# Patient Record
Sex: MALE | Race: WHITE | NOT HISPANIC OR LATINO | Employment: STUDENT | URBAN - METROPOLITAN AREA
[De-identification: names, ages, dates, MRNs, and addresses within clinical notes are randomized per-mention and may not be internally consistent; named-entity substitution may affect disease eponyms.]

---
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
## 2022-10-03 ENCOUNTER — APPOINTMENT (OUTPATIENT)
Dept: GENERAL RADIOLOGY | Facility: OTHER | Age: 22
End: 2022-10-03
Attending: STUDENT IN AN ORGANIZED HEALTH CARE EDUCATION/TRAINING PROGRAM
Payer: COMMERCIAL

## 2022-10-03 ENCOUNTER — HOSPITAL ENCOUNTER (EMERGENCY)
Facility: OTHER | Age: 22
Discharge: HOME | End: 2022-10-03
Attending: PHYSICIAN ASSISTANT | Admitting: PHYSICIAN ASSISTANT
Payer: COMMERCIAL

## 2022-10-03 VITALS
TEMPERATURE: 98.7 F | OXYGEN SATURATION: 99 % | WEIGHT: 180 LBS | SYSTOLIC BLOOD PRESSURE: 147 MMHG | DIASTOLIC BLOOD PRESSURE: 94 MMHG | RESPIRATION RATE: 16 BRPM | HEIGHT: 68 IN | HEART RATE: 93 BPM | BODY MASS INDEX: 27.28 KG/M2

## 2022-10-03 DIAGNOSIS — S82.61XA AVULSION FRACTURE OF LATERAL MALLEOLUS OF RIGHT FIBULA: ICD-10-CM

## 2022-10-03 PROCEDURE — 99283 EMERGENCY DEPT VISIT LOW MDM: CPT | Performed by: PHYSICIAN ASSISTANT

## 2022-10-03 PROCEDURE — 73610 X-RAY EXAM OF ANKLE: CPT | Mod: RT

## 2022-10-03 ASSESSMENT — ACTIVITIES OF DAILY LIVING (ADL): ADLS_ACUITY_SCORE: 33

## 2022-10-04 NOTE — DISCHARGE INSTRUCTIONS
Get plenty of fluids and rest.  Use rest, ice, compression, elevation.  Use your crutches to help with bearing weight, wear your walking boot to help for stabilization.  He can alternate Tylenol and ibuprofen every 4 hours to help with pain and swelling.  Did place referral to have you follow-up with her sports medicine specialist in approximately 1 week for reassessment.  Return if there are worse or concerning symptoms.

## 2022-10-04 NOTE — ED TRIAGE NOTES
Pt arrives via private vehicle with c/o right ankle injury. Pt was playing baseball tonight and stepped on first wrong and heard a pop, has had numbness in foot since then. Pulse present in right foot, pt feels writer touching foot, swelling noted on out right ankle. Ice pack given to pt.      Triage Assessment     Row Name 10/03/22 1928       Triage Assessment (Adult)    Airway WDL WDL       Respiratory WDL    Respiratory WDL WDL       Skin Circulation/Temperature WDL    Skin Circulation/Temperature WDL WDL       Cardiac WDL    Cardiac WDL WDL       Peripheral/Neurovascular WDL    Peripheral Neurovascular WDL WDL       Cognitive/Neuro/Behavioral WDL    Cognitive/Neuro/Behavioral WDL WDL

## 2022-10-04 NOTE — PROGRESS NOTES
Pt A&O, minimal pain reported.  Leah Villavicencio RN.............................10/3/2022 9:11 PM

## 2022-10-05 ASSESSMENT — ENCOUNTER SYMPTOMS
DYSURIA: 0
FEVER: 0
NAUSEA: 0
VOMITING: 0
CONFUSION: 0
ABDOMINAL PAIN: 0
SHORTNESS OF BREATH: 0

## 2022-10-05 NOTE — ED PROVIDER NOTES
"  History     Chief Complaint   Patient presents with     Ankle Pain     HPI  Obed Real is a 22 year old male who presents to the ED for evaluation of ankle pain. Pt arrives via private vehicle with c/o right ankle injury. Pt was playing baseball tonight and stepped on first wrong and heard a pop, has had numbness in foot since then. Pulse present in right foot, pt feels writer touching foot, swelling noted on out right ankle. Ice pack given to pt.    Allergies:  Allergies   Allergen Reactions     Sulfa Drugs        Problem List:    There are no problems to display for this patient.       Past Medical History:    History reviewed. No pertinent past medical history.    Past Surgical History:    History reviewed. No pertinent surgical history.    Family History:    History reviewed. No pertinent family history.    Social History:  Marital Status:  Single [1]  Social History     Tobacco Use     Smoking status: Never Smoker     Smokeless tobacco: Never Used   Substance Use Topics     Drug use: Never        Medications:    No current outpatient medications on file.        Review of Systems   Constitutional: Negative for fever.   HENT: Negative for congestion.    Eyes: Negative for visual disturbance.   Respiratory: Negative for shortness of breath.    Cardiovascular: Negative for chest pain.   Gastrointestinal: Negative for abdominal pain, nausea and vomiting.   Genitourinary: Negative for dysuria.   Musculoskeletal:        Right ankle swelling and pain   Psychiatric/Behavioral: Negative for confusion.       Physical Exam   BP: (!) 147/94  Pulse: 93  Temp: 98.7  F (37.1  C)  Resp: 16  Height: 172.7 cm (5' 8\")  Weight: 81.6 kg (180 lb)  SpO2: 99 %      Physical Exam  Constitutional:       General: He is not in acute distress.     Appearance: He is well-developed. He is not diaphoretic.   HENT:      Head: Normocephalic and atraumatic.   Eyes:      General: No scleral icterus.     Conjunctiva/sclera: Conjunctivae " normal.   Cardiovascular:      Rate and Rhythm: Normal rate and regular rhythm.   Pulmonary:      Effort: Pulmonary effort is normal.      Breath sounds: Normal breath sounds.   Abdominal:      Palpations: Abdomen is soft.      Tenderness: There is no abdominal tenderness.   Musculoskeletal:         General: Swelling and tenderness present. No deformity.      Cervical back: Neck supple.      Comments: Swelling to the lateral portion of right ankle.  Tenderness to palpation over the lateral malleolus.  No tenderness to palpation over the right fifth metatarsal.   Lymphadenopathy:      Cervical: No cervical adenopathy.   Skin:     General: Skin is warm and dry.      Coloration: Skin is not jaundiced.      Findings: No rash.   Neurological:      Mental Status: He is alert and oriented to person, place, and time. Mental status is at baseline.   Psychiatric:         Mood and Affect: Mood normal.         Behavior: Behavior normal.         Thought Content: Thought content normal.         Judgment: Judgment normal.         ED Course                 Procedures              Critical Care time:  none               No results found for this or any previous visit (from the past 24 hour(s)).    Medications - No data to display    Assessments & Plan (with Medical Decision Making)   Nontoxic and in NAD. He does have Swelling to the lateral portion of right ankle.  Tenderness to palpation over the lateral malleolus.  No tenderness to palpation over the right fifth metatarsal.  No tenderness to palpation to his right knee or proximal tibia.  He does have some high ankle discomfort when I squeeze there.    Xray read as:  There is marked lateral ankle swelling. There is an  avulsive injury at the tip of the lateral malleolus, likely acute  absent known prior trauma. Recommend follow-up.     We will place him in a walking boot, he will continue conservative treatment he is given crutches and told to weight-bear only as tolerated.   Referrals placed for him to follow-up with sports medicine for reassessment.    Strict return precautions are given to the pt, they will return if symptoms are worsening or concerning. The pt understands and agrees with the plan and they are discharged.     Shlomo Carmen PA-C    I have reviewed the nursing notes.    I have reviewed the findings, diagnosis, plan and need for follow up with the patient.       There are no discharge medications for this patient.      Final diagnoses:   Avulsion fracture of lateral malleolus of right fibula       10/3/2022   [facility]     Shlomo Carmen PA  10/05/22 0212

## 2022-10-12 ENCOUNTER — HOSPITAL ENCOUNTER (OUTPATIENT)
Dept: GENERAL RADIOLOGY | Facility: OTHER | Age: 22
Discharge: HOME | End: 2022-10-12
Attending: FAMILY MEDICINE
Payer: COMMERCIAL

## 2022-10-12 ENCOUNTER — OFFICE VISIT (OUTPATIENT)
Dept: FAMILY MEDICINE | Facility: OTHER | Age: 22
End: 2022-10-12
Attending: FAMILY MEDICINE
Payer: COMMERCIAL

## 2022-10-12 VITALS
BODY MASS INDEX: 28.04 KG/M2 | TEMPERATURE: 95.8 F | OXYGEN SATURATION: 98 % | SYSTOLIC BLOOD PRESSURE: 138 MMHG | DIASTOLIC BLOOD PRESSURE: 90 MMHG | HEART RATE: 93 BPM | WEIGHT: 184.4 LBS | RESPIRATION RATE: 16 BRPM

## 2022-10-12 DIAGNOSIS — S82.61XA CLOSED AVULSION FRACTURE OF LATERAL MALLEOLUS OF RIGHT FIBULA, INITIAL ENCOUNTER: Primary | ICD-10-CM

## 2022-10-12 PROCEDURE — 27786 TREATMENT OF ANKLE FRACTURE: CPT | Mod: RT | Performed by: FAMILY MEDICINE

## 2022-10-12 PROCEDURE — 73610 X-RAY EXAM OF ANKLE: CPT | Mod: RT

## 2022-10-12 RX ORDER — IBUPROFEN 200 MG
200 TABLET ORAL EVERY 4 HOURS PRN
COMMUNITY

## 2022-10-12 RX ORDER — ALBUTEROL SULFATE 90 UG/1
AEROSOL, METERED RESPIRATORY (INHALATION)
COMMUNITY
Start: 2022-01-04

## 2022-10-12 ASSESSMENT — PAIN SCALES - GENERAL: PAINLEVEL: MILD PAIN (2)

## 2022-10-12 NOTE — PROGRESS NOTES
Sports Medicine Office Note    HPI:  22-year-old male ICC  coming in for evaluation of a right ankle injury that occurred on 10/3.  He suffered an inversion ankle injury while running to first base.  He was seen in the ER.  He was found to have an avulsion fracture off the lateral malleolus.  He was placed in a boot on crutches.  His symptoms have improved.  He has 2/10 pain.  He characterizes the pain as sharp and aching.  He has difficulty with running, jumping, and climbing stairs.  He has been icing and using OTC medications.  He still has associated bruising and swelling.      EXAM:  BP (!) 138/90 (BP Location: Right arm, Patient Position: Sitting, Cuff Size: Adult Regular)   Pulse 93   Temp (!) 95.8  F (35.4  C) (Tympanic)   Resp 16   Wt 83.6 kg (184 lb 6.4 oz)   SpO2 98%   BMI 28.04 kg/m    MUSCULOSKELETAL EXAM:  RIGHT FOOT AND ANKLE  Inspection:  -No gross deformity  -Prominent bruising and swelling about the ankle/foot  -Scars:  None    Tenderness to palpation of the:  -Fibular head:  Negative  -Fibular shaft:  Negative  -Tibial shaft:  Negative  -Medial malleolus:  Negative  -Deltoid ligament: Mild pain  -Lateral malleolus: Positive  -ATFL: Positive  -CFL: Positive  -PTFL: Positive  -Syndesmosis (AITFL): Mild pain  -Midsubstance Achilles tendon:  Negative  -Achilles tendon insertion:  Negative  -Plantar fascial origin on the calcaneus:  Negative  -Base of the fifth metatarsal:  Negative  -Navicular:  Negative  -Lisfranc joint:  Negative  -Metatarsals:  Negative    Other:  -Intact sensation to light touch distally.  -No signs of cyanosis. Normal skin temperature.  -Knee:  No gross deformity. Normal motion.  -Left foot/ankle:  No gross deformity. No palpable tenderness. Normal strength.      IMAGING:  10/3/2022: 3 view right ankle x-ray  - Small avulsion fracture off of the tip of the lateral malleolus.  Mortise appears intact.  No other bony abnormalities.    10/12/2022: 3 view right  ankle x-ray  - Small avulsion fracture of the tip of the lateral malleolus is again noted.  No change in bony alignment.  Mortise remains intact.      ASSESSMENT/PLAN:  Diagnoses and all orders for this visit:  Closed avulsion fracture of lateral malleolus of right fibula, initial encounter  -     XR Ankle Right G/E 3 Views  -     CLOSED TX DISTAL FIBULA FX W/O MANIPULATION    22-year-old male  with a closed, mildly displaced avulsion fracture off of the tip of the lateral malleolus.  X-rays from 10/3 and 10/12 were both personally reviewed in the office with the findings as demonstrated above by my interpretation.  We discussed that this is essentially a lateral ligament injury.  - Continuing the walking boot  - Crutches as needed  - OTC analgesics as needed  - Discussed ice and elevation to help with swelling  - Follow-up in 5 weeks for repeat x-rays out of the boot  - At that time we will likely begin rehab and coordinating RTP with [facility]    Global fracture code billing (CPT:  14188)       Remi Jameson MD  10/12/2022  8:18 AM    Total time spent with this patient was 25 minutes which included chart review, visualization and independent interpretation of images, time spent with the patient, and documentation.    Procedure time:  0 minute(s)

## 2022-10-12 NOTE — NURSING NOTE
"Chief Complaint   Patient presents with     Ankle injury     Right ankle injury, DOI: 10/3/22     Patient presents for right ankle injury. DOI: 10/3/22. Injured from running while playing baseball and ran through the base and landed on right ankle sideways. Pain 2/10. He is in a tall walking boot and crutches at appointment, he thinks he is about 50% weight bearing on ankle with boot on.     Initial BP (!) 154/98 (BP Location: Right arm, Patient Position: Sitting, Cuff Size: Adult Regular)   Pulse 93   Temp (!) 95.8  F (35.4  C) (Tympanic)   Resp 16   Wt 83.6 kg (184 lb 6.4 oz)   SpO2 98%   BMI 28.04 kg/m   Estimated body mass index is 28.04 kg/m  as calculated from the following:    Height as of 10/3/22: 1.727 m (5' 8\").    Weight as of this encounter: 83.6 kg (184 lb 6.4 oz).       Medication Reconciliation: Complete    Desirae Hopkins LPN .......  10/12/2022  8:22 AM   "

## 2022-11-16 ENCOUNTER — HOSPITAL ENCOUNTER (OUTPATIENT)
Dept: GENERAL RADIOLOGY | Facility: OTHER | Age: 22
Discharge: HOME | End: 2022-11-16
Attending: FAMILY MEDICINE
Payer: COMMERCIAL

## 2022-11-16 ENCOUNTER — OFFICE VISIT (OUTPATIENT)
Dept: FAMILY MEDICINE | Facility: OTHER | Age: 22
End: 2022-11-16
Attending: FAMILY MEDICINE
Payer: COMMERCIAL

## 2022-11-16 VITALS
HEART RATE: 84 BPM | TEMPERATURE: 98.2 F | WEIGHT: 189 LBS | SYSTOLIC BLOOD PRESSURE: 128 MMHG | DIASTOLIC BLOOD PRESSURE: 88 MMHG | RESPIRATION RATE: 16 BRPM | OXYGEN SATURATION: 98 % | BODY MASS INDEX: 28.74 KG/M2

## 2022-11-16 DIAGNOSIS — S82.61XD CLOSED AVULSION FRACTURE OF LATERAL MALLEOLUS OF RIGHT FIBULA WITH ROUTINE HEALING, SUBSEQUENT ENCOUNTER: Primary | ICD-10-CM

## 2022-11-16 PROCEDURE — 99207 PR FRACTURE CARE IN GLOBAL PERIOD: CPT | Performed by: FAMILY MEDICINE

## 2022-11-16 PROCEDURE — 73610 X-RAY EXAM OF ANKLE: CPT | Mod: RT

## 2022-11-16 ASSESSMENT — PAIN SCALES - GENERAL: PAINLEVEL: NO PAIN (1)

## 2022-11-16 NOTE — PROGRESS NOTES
Sports Medicine Office Note    HPI:  22-year-old male ICC  coming in for follow-up evaluation of a right ankle injury that occurred on 10/3.  He suffered an inversion ankle injury while running to first base.  He was seen in the ER that day.  He followed up in this office on 10/12.  He has been in a walking boot.  He still has some dull and aching pain.  He reports his symptoms are 90% improved.  He rates his pain as 1/10.  He has been using ibuprofen but is no longer needing it.  No new injury.      EXAM:  /88 (BP Location: Right arm, Patient Position: Sitting, Cuff Size: Adult Regular)   Pulse 84   Temp 98.2  F (36.8  C) (Tympanic)   Resp 16   Wt 85.7 kg (189 lb)   SpO2 98%   BMI 28.74 kg/m    MUSCULOSKELETAL EXAM:  RIGHT FOOT AND ANKLE  Inspection:  -No gross deformity  -No bruising or swelling  -Scars:  None    Tenderness to palpation of the:  -Fibular head:  Negative  -Fibular shaft:  Negative  -Tibial shaft:  Negative  -Medial malleolus:  Negative  -Deltoid ligament:  Negative  -Lateral malleolus: Mild pain  -ATFL: Mild pain  -CFL: Mild pain  -PTFL:  Negative  -Syndesmosis (AITFL):  Negative  -Midsubstance Achilles tendon:  Negative  -Achilles tendon insertion:  Negative  -Plantar fascial origin on the calcaneus:  Negative  -Base of the fifth metatarsal:  Negative  -Navicular:  Negative  -Lisfranc joint:  Negative  -Metatarsals:  Negative    Strength:  -Dorsiflexion:  5/5  -Plantarflexion:  5/5  -Inversion:  5/5  -Eversion:  5/5    Special Tests:  -Anterior drawer test:  Negative  -Talar tilt test: Positive pain without laxity  -Kleiger's external rotation test:  Negative    Other:  -Intact sensation to light touch distally.  -No signs of cyanosis. Normal skin temperature.  -Knee:  No gross deformity. Normal motion.  -Left foot/ankle:  No gross deformity. No palpable tenderness. Normal strength.      IMAGING:  10/3/2022: 3 view right ankle x-ray  - Small avulsion fracture off of the  tip of the lateral malleolus.  Mortise appears intact.  No other bony abnormalities.     10/12/2022: 3 view right ankle x-ray  - Small avulsion fracture of the tip of the lateral malleolus is again noted.  No change in bony alignment.  Mortise remains intact.    11/16/2022: 3 view right ankle x-ray  - Avulsed fragment off of the tip of the lateral malleolus is again noted.  No change in alignment.  At this point unclear if this is a new or an old fracture fragment.      ASSESSMENT/PLAN:  Diagnoses and all orders for this visit:  Closed avulsion fracture of lateral malleolus of right fibula with routine healing, subsequent encounter  -     XR Ankle Right G/E 3 Views    22-year-old male  with a closed, mildly displaced avulsion fracture off the tip of the lateral malleolus.  This may have been an old avulsion fracture with nonunion.  His symptoms are improving.  X-rays from 10/3, 10/12, and 11/16 were all personally reviewed in the office with the findings as demonstrated above by my interpretation.  He is now 6 weeks and 2 days out from his injury  - Transition away from the walking boot  - Begin ankle rehabilitation with AT[facility] at [facility]  - Follow-up as needed  - If patient fails to progress with rehab at [facility], consider formal PT    Global fracture code billing (CPT:  38861)       Remi Jameson MD  11/16/2022  10:19 AM    Total time spent with this patient was 12 minutes which included chart review, visualization and independent interpretation of images, time spent with the patient, and documentation.    Procedure time:  0 minute(s)

## 2022-11-16 NOTE — NURSING NOTE
"Chief Complaint   Patient presents with     Follow Up     Avulsion fracture lateral malleolus right fibula      Patient is here today for follow up avulsion fracture lateral malleolus right fibula. Pain 1/10. He is in a tall walking boot at appointment today and weight bearing.     Initial /88 (BP Location: Right arm, Patient Position: Sitting, Cuff Size: Adult Regular)   Pulse 84   Temp 98.2  F (36.8  C) (Tympanic)   Resp 16   Wt 85.7 kg (189 lb)   SpO2 98%   BMI 28.74 kg/m   Estimated body mass index is 28.74 kg/m  as calculated from the following:    Height as of 10/3/22: 1.727 m (5' 8\").    Weight as of this encounter: 85.7 kg (189 lb).       Medication Reconciliation: Complete    Desirae Hopkins LPN .......  11/16/2022  10:08 AM   "

## 2023-12-12 ENCOUNTER — LAB REQUISITION (OUTPATIENT)
Dept: LAB | Facility: CLINIC | Age: 23
End: 2023-12-12
Payer: COMMERCIAL

## 2023-12-12 DIAGNOSIS — Z13.6 ENCOUNTER FOR SCREENING FOR CARDIOVASCULAR DISORDERS: ICD-10-CM

## 2023-12-12 DIAGNOSIS — N50.812 LEFT TESTICULAR PAIN: ICD-10-CM

## 2023-12-12 DIAGNOSIS — N50.811 RIGHT TESTICULAR PAIN: ICD-10-CM

## 2023-12-12 DIAGNOSIS — Z83.49 FAMILY HISTORY OF OTHER ENDOCRINE, NUTRITIONAL AND METABOLIC DISEASES: ICD-10-CM

## 2023-12-12 PROCEDURE — 80048 BASIC METABOLIC PNL TOTAL CA: CPT | Mod: ORL | Performed by: PHYSICIAN ASSISTANT

## 2023-12-12 PROCEDURE — 84443 ASSAY THYROID STIM HORMONE: CPT | Mod: ORL | Performed by: PHYSICIAN ASSISTANT

## 2023-12-12 PROCEDURE — 80061 LIPID PANEL: CPT | Mod: ORL | Performed by: PHYSICIAN ASSISTANT

## 2023-12-12 PROCEDURE — 86140 C-REACTIVE PROTEIN: CPT | Mod: ORL | Performed by: PHYSICIAN ASSISTANT

## 2023-12-13 LAB
ANION GAP SERPL CALCULATED.3IONS-SCNC: 11 MMOL/L (ref 7–15)
BUN SERPL-MCNC: 18.6 MG/DL (ref 6–20)
CALCIUM SERPL-MCNC: 9.5 MG/DL (ref 8.6–10)
CHLORIDE SERPL-SCNC: 103 MMOL/L (ref 98–107)
CHOLEST SERPL-MCNC: 140 MG/DL
CREAT SERPL-MCNC: 1.28 MG/DL (ref 0.67–1.17)
CRP SERPL-MCNC: <3 MG/L
DEPRECATED HCO3 PLAS-SCNC: 25 MMOL/L (ref 22–29)
EGFRCR SERPLBLD CKD-EPI 2021: 82 ML/MIN/1.73M2
FASTING STATUS PATIENT QL REPORTED: NORMAL
GLUCOSE SERPL-MCNC: 91 MG/DL (ref 70–99)
HDLC SERPL-MCNC: 55 MG/DL
LDLC SERPL CALC-MCNC: 75 MG/DL
NONHDLC SERPL-MCNC: 85 MG/DL
POTASSIUM SERPL-SCNC: 4.7 MMOL/L (ref 3.4–5.3)
SODIUM SERPL-SCNC: 139 MMOL/L (ref 135–145)
TRIGL SERPL-MCNC: 50 MG/DL
TSH SERPL DL<=0.005 MIU/L-ACNC: 3.06 UIU/ML (ref 0.3–4.2)

## 2024-09-09 ENCOUNTER — LAB REQUISITION (OUTPATIENT)
Dept: LAB | Facility: CLINIC | Age: 24
End: 2024-09-09
Payer: COMMERCIAL

## 2024-09-09 DIAGNOSIS — E87.5 HYPERKALEMIA: ICD-10-CM

## 2024-09-09 LAB
ANION GAP SERPL CALCULATED.3IONS-SCNC: 12 MMOL/L (ref 7–15)
BUN SERPL-MCNC: 19.1 MG/DL (ref 6–20)
CALCIUM SERPL-MCNC: 9.8 MG/DL (ref 8.8–10.4)
CHLORIDE SERPL-SCNC: 103 MMOL/L (ref 98–107)
CREAT SERPL-MCNC: 1.23 MG/DL (ref 0.67–1.17)
EGFRCR SERPLBLD CKD-EPI 2021: 85 ML/MIN/1.73M2
GLUCOSE SERPL-MCNC: 74 MG/DL (ref 70–99)
HCO3 SERPL-SCNC: 26 MMOL/L (ref 22–29)
POTASSIUM SERPL-SCNC: 4.8 MMOL/L (ref 3.4–5.3)
SODIUM SERPL-SCNC: 141 MMOL/L (ref 135–145)

## 2024-09-09 PROCEDURE — 80048 BASIC METABOLIC PNL TOTAL CA: CPT | Mod: ORL | Performed by: PHYSICIAN ASSISTANT
